# Patient Record
Sex: FEMALE | Race: WHITE | NOT HISPANIC OR LATINO | Employment: OTHER | URBAN - METROPOLITAN AREA
[De-identification: names, ages, dates, MRNs, and addresses within clinical notes are randomized per-mention and may not be internally consistent; named-entity substitution may affect disease eponyms.]

---

## 2017-05-30 ENCOUNTER — HOSPITAL ENCOUNTER (OUTPATIENT)
Dept: GENERAL RADIOLOGY | Facility: HOSPITAL | Age: 67
Discharge: HOME | End: 2017-05-30
Attending: FAMILY MEDICINE | Admitting: FAMILY MEDICINE

## 2017-05-30 ENCOUNTER — OFFICE VISIT (OUTPATIENT)
Dept: FAMILY MEDICINE CLINIC | Facility: CLINIC | Age: 67
End: 2017-05-30

## 2017-05-30 ENCOUNTER — TRANSCRIBE ORDERS (OUTPATIENT)
Dept: FAMILY MEDICINE CLINIC | Facility: CLINIC | Age: 67
End: 2017-05-30

## 2017-05-30 VITALS
BODY MASS INDEX: 28.7 KG/M2 | SYSTOLIC BLOOD PRESSURE: 130 MMHG | HEIGHT: 61 IN | OXYGEN SATURATION: 97 % | HEART RATE: 107 BPM | DIASTOLIC BLOOD PRESSURE: 88 MMHG | WEIGHT: 152 LBS

## 2017-05-30 DIAGNOSIS — M75.50 SUBACROMIAL BURSITIS: Primary | ICD-10-CM

## 2017-05-30 PROCEDURE — 20610 DRAIN/INJ JOINT/BURSA W/O US: CPT | Performed by: FAMILY MEDICINE

## 2017-05-30 PROCEDURE — 73030 X-RAY EXAM OF SHOULDER: CPT

## 2017-05-30 PROCEDURE — 99213 OFFICE O/P EST LOW 20 MIN: CPT | Performed by: FAMILY MEDICINE

## 2017-05-30 RX ORDER — CHLORAL HYDRATE 500 MG
CAPSULE ORAL
COMMUNITY

## 2017-05-30 RX ORDER — METHYLPREDNISOLONE ACETATE 80 MG/ML
80 INJECTION, SUSPENSION INTRA-ARTICULAR; INTRALESIONAL; INTRAMUSCULAR; SOFT TISSUE ONCE
Status: COMPLETED | OUTPATIENT
Start: 2017-05-30 | End: 2017-05-30

## 2017-05-30 RX ORDER — ASPIRIN 81 MG/1
81 TABLET ORAL DAILY
COMMUNITY
End: 2020-08-18

## 2017-05-30 RX ADMIN — METHYLPREDNISOLONE ACETATE 80 MG: 80 INJECTION, SUSPENSION INTRA-ARTICULAR; INTRALESIONAL; INTRAMUSCULAR; SOFT TISSUE at 14:31

## 2017-06-01 ENCOUNTER — TELEPHONE (OUTPATIENT)
Dept: FAMILY MEDICINE CLINIC | Facility: CLINIC | Age: 67
End: 2017-06-01

## 2017-06-01 NOTE — TELEPHONE ENCOUNTER
----- Message from Braydon Contreras MD sent at 6/1/2017  7:49 AM EDT -----  Call.  Shoulder x-ray is basically normal.  No arthritic changes.  No bony abnormalities or tumorous growths

## 2017-06-12 ENCOUNTER — OFFICE VISIT (OUTPATIENT)
Dept: FAMILY MEDICINE CLINIC | Facility: CLINIC | Age: 67
End: 2017-06-12

## 2017-06-12 VITALS
DIASTOLIC BLOOD PRESSURE: 86 MMHG | HEART RATE: 88 BPM | OXYGEN SATURATION: 98 % | HEIGHT: 61 IN | SYSTOLIC BLOOD PRESSURE: 120 MMHG | BODY MASS INDEX: 28.89 KG/M2 | WEIGHT: 153 LBS

## 2017-06-12 DIAGNOSIS — M75.50 SUBACROMIAL BURSITIS: Primary | ICD-10-CM

## 2017-06-12 PROCEDURE — 99213 OFFICE O/P EST LOW 20 MIN: CPT | Performed by: FAMILY MEDICINE

## 2017-06-12 RX ORDER — NAPROXEN 500 MG/1
500 TABLET ORAL 2 TIMES DAILY PRN
COMMUNITY
Start: 2017-05-30 | End: 2020-08-18

## 2017-06-12 NOTE — PROGRESS NOTES
Subjective   Maren Hollingsworth is a 66 y.o. female.     History of Present Illness   66-year-old female.  Recent issues with her right shoulder.  X-ray did not reveal a lot of degenerative changes.  Had a subacromial injection which has helped may be 70-80%.  The following portions of the patient's history were reviewed and updated as appropriate: allergies, current medications, past family history, past medical history, past social history, past surgical history and problem list.    Review of Systems   Constitutional: Positive for activity change. Negative for chills and fever.   Musculoskeletal: Positive for arthralgias. Negative for joint swelling (right shoulder).       Objective   Physical Exam   Constitutional: She appears well-nourished.   HENT:   Head: Normocephalic.   Eyes: EOM are normal.   Pulmonary/Chest: Effort normal.       Assessment/Plan   Maren was seen today for shoulder pain.    Diagnoses and all orders for this visit:    Subacromial bursitis    Follow-up of subacromial bursitis/tendinitis.  Discussed several options.  One of which is to refer her to an orthopedic surgeon.  This of course would be after getting an MRI to look for a rotator cuff tendon.  Since she has obtained improvement from the injection.  I have given her some exercises for stretching and strengthening and will tentatively follow up in 4-6 weeks.  If they feel that this isn't helping, I will arrange for an MRI.

## 2017-08-01 ENCOUNTER — OFFICE VISIT (OUTPATIENT)
Dept: OBSTETRICS AND GYNECOLOGY | Facility: CLINIC | Age: 67
End: 2017-08-01

## 2017-08-01 VITALS
SYSTOLIC BLOOD PRESSURE: 140 MMHG | WEIGHT: 157 LBS | BODY MASS INDEX: 29.64 KG/M2 | DIASTOLIC BLOOD PRESSURE: 86 MMHG | HEIGHT: 61 IN

## 2017-08-01 DIAGNOSIS — Z01.419 ENCOUNTER FOR GYNECOLOGICAL EXAMINATION WITHOUT ABNORMAL FINDING: ICD-10-CM

## 2017-08-01 DIAGNOSIS — Z13.820 SCREENING FOR OSTEOPOROSIS: ICD-10-CM

## 2017-08-01 DIAGNOSIS — M81.0 OSTEOPOROSIS: ICD-10-CM

## 2017-08-01 DIAGNOSIS — N95.2 VAGINAL ATROPHY: ICD-10-CM

## 2017-08-01 DIAGNOSIS — Z78.0 MENOPAUSE: Primary | ICD-10-CM

## 2017-08-01 PROCEDURE — G0101 CA SCREEN;PELVIC/BREAST EXAM: HCPCS | Performed by: OBSTETRICS & GYNECOLOGY

## 2017-08-01 RX ORDER — ESTRADIOL 10 UG/1
INSERT VAGINAL
Qty: 36 TABLET | Refills: 3 | Status: SHIPPED | OUTPATIENT
Start: 2017-08-01 | End: 2017-09-25 | Stop reason: SDUPTHER

## 2017-08-01 NOTE — PROGRESS NOTES
"   Chief Complaint   Patient presents with   • Gynecologic Exam   • Med Refill       Maren Hollingsworth is a 66 y.o. year old  presenting to be seen for her annual exam.Patient is menopausal and uses Vagifem, 10 µg 3 times a week for symptoms of vaginal atrophy.  She has noted improvement.  She denies bowel or urinary symptoms.  Breast imaging has been benign.    SCREENING TESTS    Year 2012   Age                         PAP    Neg.                     HPV high risk                         Mammogram     benign                    ANUJ score                         Breast MRI                         Lipids                         Vitamin D                         Colonoscopy                         DEXA  Frax (hip/any)                         Ovarian Screen                           Enter the month test was performed.  If month not known, enter \"X'  · Black numbers = normal results  · Red numbers = abnormal results  · Black X = patient reported normal  · Red X - patient reported abnormal      Referred by:    Profession:    Other info:        She exercises regularly: yes.  She wears her seat belt: yes.  She has concerns about domestic violence: no.  She has noticed changes in height: no    GYN screening history:  · Last mammogram: was done on approximately 2016 and the result was: Birads II (Benign findings)..    No Additional Complaints Reported    The following portions of the patient's history were reviewed and updated as appropriate:vital signs and   She  does not have any pertinent problems on file.  She  has a past surgical history that includes Hysteroscopy and Dilation and curettage of uterus.  Her family history includes Heart disease in her father.  She  reports that she has never smoked. She has never used smokeless tobacco. She reports that she drinks about 4.2 oz of alcohol per week  She reports " "that she does not use illicit drugs.  Current Outpatient Prescriptions   Medication Sig Dispense Refill   • aspirin 81 MG EC tablet Take 81 mg by mouth Daily.     • calcium carbonate-vitamin d (CALCIUM 600+D) 600-400 MG-UNIT per tablet Take 1 tablet by mouth Daily.     • Misc Natural Products (OSTEO BI-FLEX JOINT SHIELD PO) Take  by mouth.     • Omega-3 Fatty Acids (FISH OIL) 1000 MG capsule capsule Take  by mouth Daily With Breakfast.     • VAGIFEM 10 MCG tablet vaginal tablet Insert 1 tablet vaginally three times a week 36 tablet 3   • naproxen (NAPROSYN) 500 MG tablet Take 500 mg by mouth 2 (Two) Times a Day As Needed.       No current facility-administered medications for this visit.      She has No Known Allergies..    Review of Systems  A comprehensive review of systems was taken.  Constitutional: negative for fever, chills, activity change, appetite change, fatigue and unexpected weight change.  Respiratory: negative  Cardiovascular: negative  Gastrointestinal: negative  Genitourinary:negative  Musculoskeletal:negative  Behavioral/Psych: negative       /86  Ht 61\" (154.9 cm)  Wt 157 lb (71.2 kg)  LMP  (LMP Unknown)  BMI 29.66 kg/m2    Physical Exam    General:  alert; cooperative; well developed; well nourished   Skin:  No suspicious lesions seen   Thyroid: normal to inspection and palpation   Lungs:  clear to auscultation bilaterally   Heart:  regular rate and rhythm, S1, S2 normal, no murmur, click, rub or gallop   Breasts:  Examined in supine position  Symmetric without masses or skin dimpling  Nipples normal without inversion, lesions or discharge  There are no palpable axillary nodes   Abdomen: soft, non-tender; no masses  no umbilical or inginual hernias are present  no hepato-splenomegaly   Pelvis: Clinical staff was present for exam  External genitalia:  normal appearance of the external genitalia including Bartholin's and [place]'s glands.  Vaginal:  decrease in atrophic changes  Cervix:  " normal appearance.  Uterus:  normal size, shape and consistency. anteverted;  Adnexa:  non palpable bilaterally.  Rectal:  anus visually normal appearing. recto-vaginal exam unremarkable and confirms findings;     Lab Review   No data reviewed    Imaging  Mammogram results- benign         ASSESSMENT  Problems Addressed this Visit        Musculoskeletal and Integument    Osteoporosis       Genitourinary    Vaginal atrophy    Relevant Medications    VAGIFEM 10 MCG tablet vaginal tablet    Menopause - Primary    Relevant Orders    Liquid-based Pap Smear, Screening      Other Visit Diagnoses     Encounter for gynecological examination without abnormal finding              PLAN    Medications prescribed this encounter:    New Medications Ordered This Visit   Medications   • calcium carbonate-vitamin d (CALCIUM 600+D) 600-400 MG-UNIT per tablet     Sig: Take 1 tablet by mouth Daily.   • VAGIFEM 10 MCG tablet vaginal tablet     Sig: Insert 1 tablet vaginally three times a week     Dispense:  36 tablet     Refill:  3   · Pap test done  · DEXA ordered  · Calcium, 600 mg/ Vit. D, 400 IU daily; regular weight-bearing exercise  · Follow up: 12 month(s)  *Please note that portions of this documentation may have been completed with a voice recognition program.  Efforts were made to edit this dictation, but occasional words may have been mistranscribed.       This note was electronically signed.    LILLIANA Shearer MD  August 1, 2017  1:19 PM

## 2017-08-08 ENCOUNTER — HOSPITAL ENCOUNTER (OUTPATIENT)
Dept: BONE DENSITY | Facility: HOSPITAL | Age: 67
Discharge: HOME | End: 2017-08-08
Attending: OBSTETRICS & GYNECOLOGY | Admitting: OBSTETRICS & GYNECOLOGY

## 2017-08-08 DIAGNOSIS — Z13.820 SCREENING FOR OSTEOPOROSIS: ICD-10-CM

## 2017-08-08 PROCEDURE — 77080 DXA BONE DENSITY AXIAL: CPT

## 2017-09-12 NOTE — TELEPHONE ENCOUNTER
We received a request today from [employer], requesting authorization to dispense generic Vagifem.  This will save the patient over $100 with each refill.  Per Dr. Shearer, OK to make this substitution, and I have faxed the authorization to [employer].

## 2017-09-25 ENCOUNTER — TELEPHONE (OUTPATIENT)
Dept: OBSTETRICS AND GYNECOLOGY | Facility: CLINIC | Age: 67
End: 2017-09-25

## 2017-09-25 RX ORDER — ESTRADIOL 10 UG/1
INSERT VAGINAL
Qty: 36 TABLET | Refills: 3 | Status: SHIPPED | OUTPATIENT
Start: 2017-09-25 | End: 2018-08-07 | Stop reason: SDUPTHER

## 2017-09-25 NOTE — TELEPHONE ENCOUNTER
Pt calling to say she got RX from [facility], she had specifically requested name brand and was sent generic. She is requesting we send in for name brand only.  Explained that Bettie had gotten a msg from [facility] saying it would save pt $100 to get generic and that is why she and Dr Shearer sent in for generic. Pt says her RX cost for brand name is $25, for generic is $10. She does not want to risk possible side effects. New RX for Vagifem sent to [facility].

## 2018-08-07 ENCOUNTER — OFFICE VISIT (OUTPATIENT)
Dept: OBSTETRICS AND GYNECOLOGY | Facility: CLINIC | Age: 68
End: 2018-08-07

## 2018-08-07 VITALS
WEIGHT: 154.6 LBS | DIASTOLIC BLOOD PRESSURE: 82 MMHG | BODY MASS INDEX: 29.19 KG/M2 | HEIGHT: 61 IN | SYSTOLIC BLOOD PRESSURE: 132 MMHG

## 2018-08-07 DIAGNOSIS — Z78.0 MENOPAUSE: Primary | ICD-10-CM

## 2018-08-07 DIAGNOSIS — N95.2 VAGINAL ATROPHY: ICD-10-CM

## 2018-08-07 DIAGNOSIS — M81.0 AGE-RELATED OSTEOPOROSIS WITHOUT CURRENT PATHOLOGICAL FRACTURE: ICD-10-CM

## 2018-08-07 PROCEDURE — 99213 OFFICE O/P EST LOW 20 MIN: CPT | Performed by: OBSTETRICS & GYNECOLOGY

## 2018-08-07 RX ORDER — ESTRADIOL 10 UG/1
INSERT VAGINAL
Qty: 36 TABLET | Refills: 3 | Status: SHIPPED | OUTPATIENT
Start: 2018-08-07 | End: 2019-08-13 | Stop reason: SDUPTHER

## 2018-08-07 RX ORDER — MULTIVITAMIN WITH IRON
1 TABLET ORAL DAILY
COMMUNITY

## 2018-08-07 NOTE — PROGRESS NOTES
Chief Complaint   Patient presents with   • Gynecologic Exam     Annual exam        Maren Hollingsworth is a 67 y.o. year old  presenting to be seen because of all up for menopause with vaginal atrophy which is treated with Vagifem (name-brand) 10 µg tablets 3 times week with success.  The patient also has osteoporosis at all sites but no longer desires therapy.  She has been treated with risedronate and alendronate in the past.  She denies history of a fragility fracture.  She denies bowel or urinary symptoms.    History   Sexual Activity   • Sexual activity: Yes   • Partners: Male   • Birth control/ protection: Post-menopausal     SCREENING TESTS    Year 2012   Age                         PAP                         HPV high risk                         Mammogram      benign benign                  ANUJ score                         Breast MRI                         Lipids                         Vitamin D                         Colonoscopy                         DEXA  Frax (hip/any)      osteoporosis                   Ovarian Screen      normal                       No Additional Complaints Reported    The following portions of the patient's history were reviewed and updated as appropriate:vital signs and   She  has a past medical history of Menopause; Osteoporosis; and Vaginal atrophy.  She  does not have any pertinent problems on file.  She  has a past surgical history that includes Hysteroscopy and Dilation and curettage of uterus.  Her family history includes Heart disease in her father.  She  reports that she has never smoked. She has never used smokeless tobacco. She reports that she drinks about 4.2 oz of alcohol per week . She reports that she does not use drugs.  Current Outpatient Prescriptions   Medication Sig Dispense Refill   • calcium carbonate-vitamin d (CALCIUM 600+D) 600-400 MG-UNIT per  "tablet Take 1 tablet by mouth Daily.     • estradiol (VAGIFEM) 10 MCG tablet vaginal tablet Insert one tablet intravaginally, three times a week 36 tablet 3   • Magnesium 100 MG capsule Take 1 capsule by mouth Daily.     • Misc Natural Products (OSTEO BI-FLEX JOINT SHIELD PO) Take  by mouth.     • Omega-3 Fatty Acids (FISH OIL) 1000 MG capsule capsule Take  by mouth Daily With Breakfast.     • aspirin 81 MG EC tablet Take 81 mg by mouth Daily.     • naproxen (NAPROSYN) 500 MG tablet Take 500 mg by mouth 2 (Two) Times a Day As Needed.       No current facility-administered medications for this visit.      She has No Known Allergies..        Review of Systems  A comprehensive review of systems was done.  Constitutional: negative for fever, chills, activity change, appetite change, fatigue and unexpected weight change.  Respiratory: negative  Cardiovascular: negative  Gastrointestinal: negative  Genitourinary:negative  Musculoskeletal:negative  Behavioral/Psych: negative          /82 (BP Location: Right arm, Patient Position: Sitting, Cuff Size: Adult)   Ht 154.9 cm (60.98\")   Wt 70.1 kg (154 lb 9.6 oz)   LMP  (LMP Unknown) Comment: POST-MENOPAUSAL  BMI 29.23 kg/m²     Physical Exam    General:  alert; cooperative; well developed; well nourished   Skin:  No suspicious lesions seen   Thyroid: normal to inspection and palpation   Lungs:  clear to auscultation bilaterally   Heart:  regular rate and rhythm, S1, S2 normal, no murmur, click, rub or gallop   Breasts:  Examined in supine position  Symmetric without masses or skin dimpling  Nipples normal without inversion, lesions or discharge  There are no palpable axillary nodes   Abdomen: soft, non-tender; no masses  no umbilical or inginual hernias are present  no hepato-splenomegaly   Pelvis: Clinical staff was present for exam  External genitalia:  normal appearance of the external genitalia including Bartholin's and [place]'s glands.  Vaginal:  normal pink " mucosa without prolapse or lesions.  Cervix:  normal appearance.  Uterus:  normal size, shape and consistency. anteverted;  Adnexa:  non palpable bilaterally.  Rectal:  anus visually normal appearing. recto-vaginal exam unremarkable and confirms findings;     Lab Review   No data reviewed    Imaging   Mammogram results- Birads II, and DEXA- osteoporosis    Medical counseling was given to patient for the following topics: diagnostic results, instructions for management, risk factor reductions, prognosis, impressions, risks and benefits of treatment options and importance of treatment compliance . Total time of the encounter was 19 minute(s) and 11 minute(s)  was spent in face to face counseling.  I have counseled the patient that she has osteoporosis at all sites.  I have counseled her about her increased risk of fracture.  The patient refuses therapy.  She has taken alendronate and risedronate in the past.  She is using Vagifem tablets for atrophy and has good response.  I have counseled her about breast assessment.          ASSESSMENT  Problems Addressed this Visit        Musculoskeletal and Integument    Osteoporosis       Genitourinary    Vaginal atrophy    Relevant Medications    estradiol (VAGIFEM) 10 MCG tablet vaginal tablet    Menopause - Primary            PLAN    Medications prescribed this encounter:    New Medications Ordered This Visit   Medications   • estradiol (VAGIFEM) 10 MCG tablet vaginal tablet     Sig: Insert one tablet intravaginally, three times a week     Dispense:  36 tablet     Refill:  3   · Monthly self breast assessment and annual breast imaging  · Follow up: 12 month(s)  · Calcium, 600 mg/ Vit. D, 400 IU daily     *Please note that portions of this documentation may have been completed with a voice recognition program.  Efforts were made to edit this dictation, but occasional words may have been mistranscribed.     This note was electronically signed.    LILLIANA Shearer MD  August 7,  2018  1:32 PM

## 2019-08-13 ENCOUNTER — OFFICE VISIT (OUTPATIENT)
Dept: OBSTETRICS AND GYNECOLOGY | Facility: CLINIC | Age: 69
End: 2019-08-13

## 2019-08-13 VITALS
DIASTOLIC BLOOD PRESSURE: 80 MMHG | WEIGHT: 156.2 LBS | SYSTOLIC BLOOD PRESSURE: 156 MMHG | HEIGHT: 61 IN | BODY MASS INDEX: 29.49 KG/M2

## 2019-08-13 DIAGNOSIS — M81.0 AGE-RELATED OSTEOPOROSIS WITHOUT CURRENT PATHOLOGICAL FRACTURE: ICD-10-CM

## 2019-08-13 DIAGNOSIS — Z78.0 MENOPAUSE: Primary | ICD-10-CM

## 2019-08-13 DIAGNOSIS — N95.2 VAGINAL ATROPHY: ICD-10-CM

## 2019-08-13 PROCEDURE — G0101 CA SCREEN;PELVIC/BREAST EXAM: HCPCS | Performed by: OBSTETRICS & GYNECOLOGY

## 2019-08-13 RX ORDER — ESTRADIOL 10 UG/1
INSERT VAGINAL
Qty: 36 TABLET | Refills: 3 | Status: SHIPPED | OUTPATIENT
Start: 2019-08-13 | End: 2019-08-20 | Stop reason: SDUPTHER

## 2019-08-13 NOTE — PROGRESS NOTES
Chief Complaint   Patient presents with   • Gynecologic Exam   • Med Refill       Maren Hollingsworth is a 68 y.o. year old  presenting to be seen for her annual exam.  This patient is menopausal and uses Vagifem, 10 mcg tablets intravaginally 3 times a week to treat symptoms of vaginal atrophy.  She has no side effects on this medication.  She has previously had a hysteroscopy/D&C.  She has a history of osteoporosis but does not desire treatment.  She has been treated with alendronate and risedronate in the past.  She has had no agility fractures.  She denies bowel or urinary symptoms.    SCREENING TESTS    Year 2012   Age                         PAP                         HPV high risk                         Mammogram       benign benign                 ANUJ score                         Breast MRI                         Lipids                         Vitamin D                         Colonoscopy                         DEXA  Frax (hip/any)      Porosis                   Ovarian Screen      normal normal                      She exercises regularly: yes.  She wears her seat belt: yes.  She has concerns about domestic violence: no.  She has noticed changes in height: no    GYN screening history:  · Last mammogram: was done on approximately 2019 and the result was: Birads II (Benign findings).  · Last DEXA: was done on approximately 2017 and the results were: osteoporosis of hips and osteoporosis of spine.    No Additional Complaints Reported    The following portions of the patient's history were reviewed and updated as appropriate:vital signs and   She  has a past medical history of Menopause, Osteoporosis, and Vaginal atrophy.  She does not have any pertinent problems on file.  She  has a past surgical history that includes Hysteroscopy and Dilation and curettage of uterus.  Her family history  "includes Heart disease in her father.  She  reports that she has never smoked. She has never used smokeless tobacco. She reports that she drinks about 4.2 oz of alcohol per week. She reports that she does not use drugs.  Current Outpatient Medications   Medication Sig Dispense Refill   • aspirin 81 MG EC tablet Take 81 mg by mouth Daily.     • calcium carbonate-vitamin d (CALCIUM 600+D) 600-400 MG-UNIT per tablet Take 1 tablet by mouth Daily.     • estradiol (VAGIFEM) 10 MCG tablet vaginal tablet Insert one tablet intravaginally, three times a week 36 tablet 3   • Magnesium 100 MG capsule Take 1 capsule by mouth Daily.     • Misc Natural Products (OSTEO BI-FLEX JOINT SHIELD PO) Take  by mouth.     • naproxen (NAPROSYN) 500 MG tablet Take 500 mg by mouth 2 (Two) Times a Day As Needed.     • Omega-3 Fatty Acids (FISH OIL) 1000 MG capsule capsule Take  by mouth Daily With Breakfast.       No current facility-administered medications for this visit.      She has No Known Allergies..    Review of Systems  A comprehensive review of systems was taken.  Constitutional: negative for fever, chills, activity change, appetite change, fatigue and unexpected weight change.  Respiratory: negative  Cardiovascular: negative  Gastrointestinal: negative  Genitourinary:negative  Musculoskeletal:negative  Behavioral/Psych: negative       /80   Ht 154.9 cm (61\")   Wt 70.9 kg (156 lb 3.2 oz)   LMP  (LMP Unknown) Comment: POST-MENOPAUSAL  Breastfeeding? No   BMI 29.51 kg/m²     Physical Exam    General:  alert; cooperative; well developed; well nourished   Skin:  No suspicious lesions seen   Thyroid: normal to inspection and palpation   Lungs:  clear to auscultation bilaterally   Heart:  regular rate and rhythm, S1, S2 normal, no murmur, click, rub or gallop   Breasts:  Examined in supine position  Symmetric without masses or skin dimpling  Nipples normal without inversion, lesions or discharge  There are no palpable axillary " nodes   Abdomen: soft, non-tender; no masses  no umbilical or inguinal hernias are present  no hepato-splenomegaly   Pelvis: Clinical staff was present for exam  External genitalia:  normal appearance of the external genitalia including Bartholin's and [place]'s glands.  Vaginal:  normal pink mucosa without prolapse or lesions.  Cervix:  normal appearance.  Uterus:  normal size, shape and consistency. anteverted;  Adnexa:  non palpable bilaterally.  Rectal:  anus visually normal appearing. recto-vaginal exam unremarkable and confirms findings;     Lab Review   No data reviewed    Imaging  Mammogram results- Birads II         ASSESSMENT  Problems Addressed this Visit        Musculoskeletal and Integument    Osteoporosis       Genitourinary    Vaginal atrophy    Relevant Medications    estradiol (VAGIFEM) 10 MCG tablet vaginal tablet    Menopause - Primary          PLAN    Medications prescribed this encounter:    New Medications Ordered This Visit   Medications   • estradiol (VAGIFEM) 10 MCG tablet vaginal tablet     Sig: Insert one tablet intravaginally, three times a week     Dispense:  36 tablet     Refill:  3   · Monthly self breast assessment and annual breast imaging  · I have again discussed the clinical findings of osteoporosis with the patient.  She does not desire treatment.  I have cautioned her about fall prevention.  · Calcium, 600 mg/ Vit. D, 400 IU daily; regular weight-bearing exercise  · Follow up: 12 month(s)  *Please note that portions of this documentation may have been completed with a voice recognition program.  Efforts were made to edit this dictation, but occasional words may have been mistranscribed.       This note was electronically signed.    LILLIANA Shearer MD  August 13, 2019  1:17 PM

## 2019-08-19 ENCOUNTER — TELEPHONE (OUTPATIENT)
Dept: OBSTETRICS AND GYNECOLOGY | Facility: CLINIC | Age: 69
End: 2019-08-19

## 2019-08-19 NOTE — TELEPHONE ENCOUNTER
Attempted to return patient's call.  No answer, but I left a message on voicemail indicating that I would try to reach her tomorrow.

## 2019-08-19 NOTE — TELEPHONE ENCOUNTER
PT STATES SHE WAS IN THE OFFICE AND SPOKE TO DR PORTILLO ABOUT VAGIFEM AND SHE DOES NOT WANT THE GENERIC - SHE RECEIVED IN THE MAIL TODAY AND IT WAS GENERIC - SHE WANTS US TO CALL IT BACK IN AS NORMAL NOT THE GENERIC - I TOLD HER I WOULD GIVE THE MESSAGE TO THE NURSE - SHE WANTS TO KNOW DOES SHE NEED TO SEND THIS BACK OR KEEP IT? I ADVISED HER I WASNT SURE BUT THAT I WOULD SEND THE NURSE THIS MESSSAGE

## 2019-08-20 RX ORDER — ESTRADIOL 10 UG/1
INSERT VAGINAL
Qty: 36 TABLET | Refills: 3 | Status: SHIPPED | OUTPATIENT
Start: 2019-08-20 | End: 2020-08-18 | Stop reason: SDUPTHER

## 2019-08-20 NOTE — TELEPHONE ENCOUNTER
Will resend prescription with DNS/name brand only specified.  She states that this has happened before and her mail order pharmacy will allow her to send generic back to them.

## 2020-08-18 ENCOUNTER — OFFICE VISIT (OUTPATIENT)
Dept: OBSTETRICS AND GYNECOLOGY | Facility: CLINIC | Age: 70
End: 2020-08-18

## 2020-08-18 VITALS
BODY MASS INDEX: 29.53 KG/M2 | WEIGHT: 156.4 LBS | HEIGHT: 61 IN | TEMPERATURE: 98.6 F | SYSTOLIC BLOOD PRESSURE: 172 MMHG | DIASTOLIC BLOOD PRESSURE: 94 MMHG

## 2020-08-18 DIAGNOSIS — N95.2 VAGINAL ATROPHY: ICD-10-CM

## 2020-08-18 DIAGNOSIS — Z78.0 MENOPAUSE: Primary | ICD-10-CM

## 2020-08-18 PROCEDURE — 99213 OFFICE O/P EST LOW 20 MIN: CPT | Performed by: OBSTETRICS & GYNECOLOGY

## 2020-08-18 RX ORDER — ESTRADIOL 10 UG/1
INSERT VAGINAL
Qty: 36 TABLET | Refills: 3 | Status: SHIPPED | OUTPATIENT
Start: 2020-08-18 | End: 2021-05-10

## 2020-08-18 NOTE — PROGRESS NOTES
Chief Complaint   Patient presents with   • Gynecologic Exam   • Med Refill       Maren Hollingsworth is a 69 y.o. year old  presenting to be seen because of follow-up for menopause resulting in vaginal atrophy and dryness.  This patient does not use systemic estrogen/progestin therapy.  She uses Yuvafem vaginal tablets, 10 mcg intravaginally 3 times a week with relief of symptoms.  She has no side effects on this medication.  She has previously had a hysteroscopy/D&C/endometrial polypectomy.  She has had no other gynecologic surgery.  She denies bowel or urinary symptoms.  She has a diagnosis of osteoporosis of the spine and femoral necks.  She has had no fractures.  She refuses therapy.    Social History     Substance and Sexual Activity   Sexual Activity Yes   • Partners: Male   • Birth control/protection: Post-menopausal     SCREENING TESTS    Year 2012   Age                         PAP      Neg.                   HPV high risk                         Mammogram        benign benign                ANUJ score                         Breast MRI                         Lipids                         Vitamin D                         Colonoscopy                         DEXA  Frax (hip/any)      osteoporosis                   Ovarian Screen                             No Additional Complaints Reported    The following portions of the patient's history were reviewed and updated as appropriate:vital signs and   She  has a past medical history of Menopause, Osteoporosis, and Vaginal atrophy.  She does not have any pertinent problems on file.  She  has a past surgical history that includes Hysteroscopy and Dilation and curettage of uterus.  Her family history includes Heart disease in her father.  She  reports that she has never smoked. She has never used smokeless tobacco. She reports that she drinks about 7.0  "standard drinks of alcohol per week. She reports that she does not use drugs.  Current Outpatient Medications   Medication Sig Dispense Refill   • calcium carbonate-vitamin d (CALCIUM 600+D) 600-400 MG-UNIT per tablet Take 1 tablet by mouth Daily.     • estradiol (Vagifem) 10 MCG tablet vaginal tablet Insert one tablet intravaginally, 3X a week 36 tablet 3   • Magnesium 100 MG capsule Take 1 capsule by mouth Daily.     • Misc Natural Products (OSTEO BI-FLEX JOINT SHIELD PO) Take  by mouth.     • Omega-3 Fatty Acids (FISH OIL) 1000 MG capsule capsule Take  by mouth Daily With Breakfast.       No current facility-administered medications for this visit.      She has No Known Allergies..        Review of Systems  A review of systems was done.  She denies cough, fever, shortness of breath, and any loss of sense of taste or smell  Constitutional: negative for fever, chills, activity change, appetite change, fatigue and unexpected weight change.  Respiratory: negative  Cardiovascular: negative  Gastrointestinal: negative  Genitourinary:negative  Musculoskeletal:negative  Behavioral/Psych: negative          /94   Temp 98.6 °F (37 °C)   Ht 154.9 cm (61\")   Wt 70.9 kg (156 lb 6.4 oz)   LMP  (LMP Unknown) Comment: POST-MENOPAUSAL  Breastfeeding No   BMI 29.55 kg/m²     Physical Exam    General:  alert; cooperative; well developed; well nourished   Skin:  No suspicious lesions seen   Thyroid: normal to inspection and palpation   Lungs:  clear to auscultation bilaterally   Heart:  regular rate and rhythm, S1, S2 normal, no murmur, click, rub or gallop   Breasts:  Examined in supine position  Symmetric without masses or skin dimpling  Nipples normal without inversion, lesions or discharge  There are no palpable axillary nodes   Abdomen: soft, non-tender; no masses  no umbilical or inguinal hernias are present  no hepato-splenomegaly   Pelvis: Clinical staff was present for exam  External genitalia:  normal appearance " of the external genitalia including Bartholin's and Haslet's glands.  Vaginal:  normal pink mucosa without prolapse or lesions.  Cervix:  normal appearance.  Uterus:  normal size, shape and consistency. anteverted;  Adnexa:  non palpable bilaterally.  Rectal:  anus visually normal appearing. recto-vaginal exam unremarkable and confirms findings;     Lab Review   No data reviewed    Imaging   Mammogram results    Medical counseling was given to patient for the following topics: diagnostic results, instructions for management, risk factor reductions, impressions, risks and benefits of treatment options and importance of treatment compliance . Total time of the encounter was 21 minute(s) and 11 minute(s)  was spent in face to face counseling.  I have counseled the patient that the Yuvafem vaginal tablets are effectively treating the vaginal mucosa.  I have reviewed the low risk of side effects with her.  I counseled her in monthly self breast assessment.  I have had a long discussion with the patient about her diagnosis of osteoporosis and her increased risk of fracture.  Since she does not desire therapy I have strongly advised her to continue calcium with vitamin D and to practice fall prevention techniques.  She is aware of her increased risk.          ASSESSMENT  Problems Addressed this Visit        Genitourinary    Vaginal atrophy    Relevant Medications    estradiol (Vagifem) 10 MCG tablet vaginal tablet    Menopause - Primary           Substance History:    reports that she has never smoked. She has never used smokeless tobacco.    reports that she drinks about 7.0 standard drinks of alcohol per week.    reports that she does not use drugs.    Substance use counseling is not indicated based on patient history.  She indicates that her alcohol intake is no more than once a day.      PLAN    Medications prescribed this encounter:    New Medications Ordered This Visit   Medications   • estradiol (Vagifem) 10 MCG  tablet vaginal tablet     Sig: Insert one tablet intravaginally, 3X a week     Dispense:  36 tablet     Refill:  3   · Monthly self breast assessment and annual breast imaging  · Information about fall prevention techniques  · Follow up: 12 month(s)  · Calcium, 600 mg/ Vit. D, 400 IU daily, regular, weight-bearing exercise 4 days a week     *Please note that portions of this documentation may have been completed with a voice recognition program.  Efforts were made to edit this dictation, but occasional words may have been mistranscribed.     This note was electronically signed.    LILLIANA Shearer MD  August 18, 2020  13:16

## 2021-05-08 DIAGNOSIS — N95.2 VAGINAL ATROPHY: ICD-10-CM

## 2021-05-10 RX ORDER — ESTRADIOL 10 UG/1
INSERT VAGINAL
Qty: 36 TABLET | Refills: 1 | Status: SHIPPED | OUTPATIENT
Start: 2021-05-10 | End: 2021-08-19 | Stop reason: SDUPTHER

## 2021-08-19 ENCOUNTER — OFFICE VISIT (OUTPATIENT)
Dept: OBSTETRICS AND GYNECOLOGY | Facility: CLINIC | Age: 71
End: 2021-08-19

## 2021-08-19 VITALS
HEIGHT: 61 IN | WEIGHT: 155.4 LBS | BODY MASS INDEX: 29.34 KG/M2 | SYSTOLIC BLOOD PRESSURE: 142 MMHG | DIASTOLIC BLOOD PRESSURE: 88 MMHG

## 2021-08-19 DIAGNOSIS — Z01.419 ENCOUNTER FOR GYNECOLOGICAL EXAMINATION WITHOUT ABNORMAL FINDING: Primary | ICD-10-CM

## 2021-08-19 DIAGNOSIS — N95.2 VAGINAL ATROPHY: ICD-10-CM

## 2021-08-19 DIAGNOSIS — Z78.0 MENOPAUSE: ICD-10-CM

## 2021-08-19 PROCEDURE — G0101 CA SCREEN;PELVIC/BREAST EXAM: HCPCS | Performed by: OBSTETRICS & GYNECOLOGY

## 2021-08-19 RX ORDER — ESTRADIOL 10 UG/1
INSERT VAGINAL
Qty: 36 TABLET | Refills: 3 | Status: SHIPPED | OUTPATIENT
Start: 2021-08-19 | End: 2022-05-27 | Stop reason: SDUPTHER

## 2021-08-19 RX ORDER — MULTIPLE VITAMINS W/ MINERALS TAB 9MG-400MCG
1 TAB ORAL DAILY
COMMUNITY

## 2021-08-19 RX ORDER — ATORVASTATIN CALCIUM 20 MG/1
20 TABLET, FILM COATED ORAL DAILY
COMMUNITY

## 2021-08-19 NOTE — PROGRESS NOTES
Chief Complaint   Patient presents with   • Gynecologic Exam   • Med Refill       Maren Hollingsworth is a 70 y.o. year old  presenting to be seen for her annual exam.  This patient is menopausal and uses Yuvafem vaginal tablets, 10 mcg 3 times a week to treat symptoms of vaginal atrophy.  She has no side effects on this medication.  She has previously had a hysteroscopy/D&C/endometrial polypectomy for a benign endometrial polyp.  She has osteoporosis of the spine and hip but has refused therapy.  She has been cautioned about the need for fall prevention.  She denies bowel or urinary symptoms.  She has had Covid-19 immunization.    SCREENING TESTS    Year 2012   Age                         PAP      Neg.                   HPV high risk                         Mammogram         benign benign               ANUJ score                         Breast MRI                         Lipids                         Vitamin D                         Colonoscopy                         DEXA  Frax (hip/any)      osteoporosis                   Ovarian Screen                             She exercises regularly: yes.  She wears her seat belt: yes.  She has concerns about domestic violence: no.  She has noticed changes in height: no    GYN screening history:  · Last mammogram: was done on approximately 2021 and the result was: Birads I (Normal).  · Last DEXA: was done on approximately 2017 and the results were: osteoporosis of hips and osteoporosis of spine.    No Additional Complaints Reported    The following portions of the patient's history were reviewed and updated as appropriate:vital signs and   She  has a past medical history of Hyperlipidemia, Menopause, Osteoporosis, and Vaginal atrophy.  She does not have any pertinent problems on file.  She  has a past surgical history that includes Hysteroscopy and Dilation  "and curettage of uterus.  Her family history includes Heart disease in her father.  She  reports that she has never smoked. She has never used smokeless tobacco. She reports current alcohol use of about 7.0 standard drinks of alcohol per week. She reports that she does not use drugs.  Current Outpatient Medications   Medication Sig Dispense Refill   • atorvastatin (LIPITOR) 20 MG tablet Take 20 mg by mouth Daily.     • multivitamin with minerals (MULTIVITAMIN ADULTS 50+ PO) Take 1 tablet by mouth Daily.     • calcium carbonate-vitamin d (CALCIUM 600+D) 600-400 MG-UNIT per tablet Take 1 tablet by mouth Daily.     • estradiol (VAGIFEM) 10 MCG tablet vaginal tablet INSERT 1 TABLET VAGINALLY 3TIMES A WEEK 36 tablet 3   • Magnesium 100 MG capsule Take 1 capsule by mouth Daily.     • Misc Natural Products (OSTEO BI-FLEX JOINT SHIELD PO) Take  by mouth.     • Omega-3 Fatty Acids (FISH OIL) 1000 MG capsule capsule Take  by mouth Daily With Breakfast.       No current facility-administered medications for this visit.     She has No Known Allergies..    Review of Systems  A review of systems was taken.  Constitutional: negative for fever, chills, activity change, appetite change, fatigue and unexpected weight change.  Respiratory: negative  Cardiovascular: negative  Gastrointestinal: negative  Genitourinary:negative  Musculoskeletal:negative  Behavioral/Psych: negative     Counseling/Anticipatory Guidance Discussed: nutrition, physical activity, healthy weight, injury prevention, immunizations, screenings and self-breast exam      /88   Ht 154.9 cm (61\")   Wt 70.5 kg (155 lb 6.4 oz)   LMP  (LMP Unknown) Comment: POST-MENOPAUSAL  Breastfeeding No   BMI 29.36 kg/m²     BMI reviewed     MEDICALLY INDICATED   Physical Exam    Neuro: alert and oriented to person, place and time    General:  alert; cooperative; well developed; well nourished   Skin:  No suspicious lesions seen   Thyroid: normal to inspection and " palpation   Lungs:  breathing is unlabored  clear to auscultation bilaterally   Heart:  regular rate and rhythm, S1, S2 normal, no murmur, click, rub or gallop  normal apical impulse   Breasts:  Examined in supine position  Symmetric without masses or skin dimpling  Nipples normal without inversion, lesions or discharge  There are no palpable axillary nodes   Abdomen: soft, non-tender; no masses  no umbilical or inguinal hernias are present  no hepato-splenomegaly   Pelvis: Clinical staff was present for exam  External genitalia:  normal appearance of the external genitalia including Bartholin's and Caroleen's glands.  Vaginal:  normal pink mucosa without prolapse or lesions.  Cervix:  normal appearance.  Uterus:  normal size, shape and consistency. anteverted;  Adnexa:  non palpable bilaterally.  Rectal:  anus visually normal appearing. recto-vaginal exam unremarkable and confirms findings;     Lab Review   No data reviewed    Imaging  Mammogram results and DEXA              ASSESSMENT  Problems Addressed this Visit        Genitourinary and Reproductive     Vaginal atrophy    Relevant Medications    estradiol (VAGIFEM) 10 MCG tablet vaginal tablet    Menopause      Other Visit Diagnoses     Encounter for gynecological examination without abnormal finding    -  Primary      Diagnoses       Codes Comments    Encounter for gynecological examination without abnormal finding    -  Primary ICD-10-CM: Z01.419  ICD-9-CM: V72.31     Menopause     ICD-10-CM: Z78.0  ICD-9-CM: 627.2     Vaginal atrophy     ICD-10-CM: N95.2  ICD-9-CM: 627.3               Substance History:   reports that she has never smoked. She has never used smokeless tobacco.   reports current alcohol use of about 7.0 standard drinks of alcohol per week.   reports no history of drug use.    Substance use counseling is not indicated based on patient history.  She indicates that her alcohol intake is controlled.      PLAN    Medications prescribed this  encounter:    New Medications Ordered This Visit   Medications   • estradiol (VAGIFEM) 10 MCG tablet vaginal tablet     Sig: INSERT 1 TABLET VAGINALLY 3TIMES A WEEK     Dispense:  36 tablet     Refill:  3   · Monthly self breast assessment and annual breast imaging  · Calcium, 600 mg/ Vit. D, 400 IU daily; regular weight-bearing exercise  · Follow up: 12 month(s)  *Please note that portions of this documentation may have been completed with a voice recognition program.  Efforts were made to edit this dictation, but occasional words may have been mistranscribed.       This note was electronically signed.    LILLIANA Shearer MD  August 19, 2021  13:23 EDT

## 2022-05-27 ENCOUNTER — TELEPHONE (OUTPATIENT)
Dept: OBSTETRICS AND GYNECOLOGY | Facility: CLINIC | Age: 72
End: 2022-05-27

## 2022-05-27 DIAGNOSIS — N95.2 VAGINAL ATROPHY: ICD-10-CM

## 2022-05-27 RX ORDER — ESTRADIOL 10 UG/1
INSERT VAGINAL
Qty: 36 TABLET | Refills: 0 | Status: SHIPPED | OUTPATIENT
Start: 2022-05-27

## 2022-05-31 ENCOUNTER — TELEPHONE (OUTPATIENT)
Dept: OBSTETRICS AND GYNECOLOGY | Facility: CLINIC | Age: 72
End: 2022-05-31

## 2022-05-31 NOTE — TELEPHONE ENCOUNTER
ALEJANDRA Botello called our office because of confusion regarding this patient's prescription for Vagifem 10 mcg.  They were questioning Dr. Shearer's state license being active, when, in fact, the most recent prescription was authorized by RODRICK Suarez.  After speaking with a representative, they will cancel Dr. Shearer's old prescription and fill the prescription sent in last week from our office listing [facility] as the provider.

## 2022-08-24 ENCOUNTER — OFFICE VISIT (OUTPATIENT)
Dept: OBSTETRICS AND GYNECOLOGY | Facility: CLINIC | Age: 72
End: 2022-08-24

## 2022-08-24 VITALS
DIASTOLIC BLOOD PRESSURE: 82 MMHG | SYSTOLIC BLOOD PRESSURE: 140 MMHG | WEIGHT: 159.2 LBS | HEIGHT: 61 IN | BODY MASS INDEX: 30.06 KG/M2

## 2022-08-24 DIAGNOSIS — N95.2 ATROPHY OF VAGINA: Primary | ICD-10-CM

## 2022-08-24 PROCEDURE — 99213 OFFICE O/P EST LOW 20 MIN: CPT | Performed by: NURSE PRACTITIONER

## 2022-08-24 RX ORDER — ASPIRIN 81 MG/1
81 TABLET ORAL DAILY
COMMUNITY

## 2022-08-24 NOTE — PROGRESS NOTES
"Chief Complaint  Maren Hollingsworth is a 71 y.o.  female presenting for Gynecologic Exam and Med Refill (Estradiol (Vagifem) 10 mcg 3 X week (#36 with refills).  [facility].)    History of Present Illness  Maren is a very pleasant 72yo woman, , who is here today for follow up of atrophy in the vagina.  She is using Vagifem faithfully and feels that it is helpful.  No urinary problems.  No vaginal infections or UTIs.  She has osteoporosis, but declines bisphosphonates.  We discussed walking, calcium, and Vit D intake.  She is up to date on preventive health procedures.    The following portions of the patient's history were reviewed and updated as appropriate: allergies, current medications, past family history, past medical history, past social history, past surgical history and problem list.    No Known Allergies      Current Outpatient Medications:   •  aspirin 81 MG EC tablet, Take 81 mg by mouth Daily., Disp: , Rfl:   •  atorvastatin (LIPITOR) 20 MG tablet, Take 20 mg by mouth Daily., Disp: , Rfl:   •  calcium carbonate-vitamin d (CALCIUM 600+D) 600-400 MG-UNIT per tablet, Take 1 tablet by mouth Daily., Disp: , Rfl:   •  estradiol (VAGIFEM) 10 MCG tablet vaginal tablet, INSERT 1 TABLET VAGINALLY 3TIMES A WEEK, Disp: 36 tablet, Rfl: 0  •  Magnesium 100 MG capsule, Take 1 capsule by mouth Daily., Disp: , Rfl:   •  Misc Natural Products (OSTEO BI-FLEX JOINT SHIELD PO), Take  by mouth., Disp: , Rfl:   •  multivitamin with minerals (MULTIVITAMIN ADULTS 50+ PO), Take 1 tablet by mouth Daily., Disp: , Rfl:   •  Omega-3 Fatty Acids (FISH OIL) 1000 MG capsule capsule, Take  by mouth Daily With Breakfast., Disp: , Rfl:     Past Medical History:   Diagnosis Date   • Hyperlipidemia    • Menopause    • Osteoporosis    • Vaginal atrophy         Past Surgical History:   Procedure Laterality Date   • DILATATION AND CURETTAGE     • HYSTEROSCOPY         Objective  /82   Ht 154.9 cm (61\")   Wt 72.2 kg (159 lb 3.2 oz) "   LMP  (LMP Unknown) Comment: POST-MENOPAUSAL  Breastfeeding No   BMI 30.08 kg/m²     Physical Exam  Exam conducted with a chaperone present.   Constitutional:       Appearance: Normal appearance.   HENT:      Head: Normocephalic.   Neck:      Thyroid: No thyroid mass or thyromegaly.   Cardiovascular:      Rate and Rhythm: Normal rate and regular rhythm.      Heart sounds: Normal heart sounds.   Pulmonary:      Effort: Pulmonary effort is normal.      Breath sounds: Normal breath sounds.   Chest:   Breasts:      Right: No inverted nipple, mass, nipple discharge, axillary adenopathy or supraclavicular adenopathy.      Left: No inverted nipple, mass, nipple discharge, axillary adenopathy or supraclavicular adenopathy.       Abdominal:      Palpations: Abdomen is soft. There is no mass.      Tenderness: There is no abdominal tenderness.   Genitourinary:     General: Normal vulva.      Labia:         Right: No lesion.         Left: No lesion.       Vagina: Normal. No erythema.      Cervix: No discharge, lesion or erythema.      Uterus: Not enlarged and not tender.       Adnexa:         Right: No mass or tenderness.          Left: No mass or tenderness.        Comments: Anus appears wnl.  No rectal exam performed.  Lymphadenopathy:      Upper Body:      Right upper body: No supraclavicular or axillary adenopathy.      Left upper body: No supraclavicular or axillary adenopathy.   Neurological:      Mental Status: She is alert.         Assessment/Plan   Diagnoses and all orders for this visit:    1. Atrophy of vagina (Primary)    (Well controlled atrophy)    Procedures    40 to 64: Counseling/Anticipatory Guidance Discussed: nutrition, physical activity, screenings, self-breast exam and Avoiding falls / exercise & walking    Return in about 1 year (around 8/24/2023) for Annual physical.    Maria Del Carmen Groves, APRN  08/24/2022

## 2022-10-25 ENCOUNTER — HOSPITAL ENCOUNTER (OUTPATIENT)
Dept: CARDIOLOGY | Facility: HOSPITAL | Age: 72
Discharge: HOME | End: 2022-10-25

## 2022-10-25 ENCOUNTER — OFFICE VISIT (OUTPATIENT)
Dept: CARDIOLOGY | Facility: HOSPITAL | Age: 72
End: 2022-10-25

## 2022-10-25 VITALS
RESPIRATION RATE: 17 BRPM | DIASTOLIC BLOOD PRESSURE: 85 MMHG | SYSTOLIC BLOOD PRESSURE: 145 MMHG | OXYGEN SATURATION: 97 % | WEIGHT: 157.4 LBS | HEIGHT: 61 IN | HEART RATE: 101 BPM | TEMPERATURE: 96.9 F | BODY MASS INDEX: 29.72 KG/M2

## 2022-10-25 DIAGNOSIS — R00.0 TACHYCARDIA: ICD-10-CM

## 2022-10-25 DIAGNOSIS — R94.31 ABNORMAL ELECTROCARDIOGRAM (ECG) (EKG): ICD-10-CM

## 2022-10-25 DIAGNOSIS — E78.2 MIXED HYPERLIPIDEMIA: ICD-10-CM

## 2022-10-25 DIAGNOSIS — R00.0 TACHYCARDIA: Primary | ICD-10-CM

## 2022-10-25 PROCEDURE — 99213 OFFICE O/P EST LOW 20 MIN: CPT | Performed by: NURSE PRACTITIONER

## 2022-10-25 RX ORDER — MELATONIN
1000 DAILY
COMMUNITY

## 2022-10-25 NOTE — PROGRESS NOTES
"Chief Complaint  Establish Care and Rapid Heart Rate    Subjective    History of Present Illness {CC  Problem List  Visit  Diagnosis   Encounters  Notes  Medications  Labs  Result Review Imaging  Media :23}       History of Present Illness   71-year-old female presents the office today at the request of her primary care provider for ongoing evaluation of her tachycardia.  She reports that her Apple Watch alerted her recently that her heart rate was elevated.  She did note she felt like her heart was racing and her Apple Watch told her it was in the 160s.  Patient reports that episode lasted about an hour and resolved.  She has had no further recurrence.  Patient does report occasional palpitations but denies chest pain, dizziness, dyspnea presyncope or syncope.  Patient reports blood pressures well controlled at home 120s to 130s.  Blood pressure elevated today because patient notes she is somewhat apprehensive about being in the doctor's office.  Objective     Vital Signs:   Vitals:    10/25/22 1051 10/25/22 1053 10/25/22 1054 10/25/22 1624   BP: (!) 187/94 169/100 172/100 145/85   BP Location: Right arm Left arm Left arm Left arm   Patient Position: Sitting Sitting Standing Sitting   Cuff Size: Adult Adult Adult    Pulse: 101 94 101    Resp: 17      Temp: 96.9 °F (36.1 °C)      TempSrc: Temporal      SpO2: 97%      Weight: 71.4 kg (157 lb 6.4 oz)      Height: 154.9 cm (61\")        Body mass index is 29.74 kg/m².  Physical Exam  Vitals and nursing note reviewed.   Constitutional:       Appearance: Normal appearance.   HENT:      Head: Normocephalic.   Eyes:      Pupils: Pupils are equal, round, and reactive to light.   Cardiovascular:      Rate and Rhythm: Normal rate and regular rhythm.      Pulses: Normal pulses.      Heart sounds: Normal heart sounds. No murmur heard.  Pulmonary:      Effort: Pulmonary effort is normal.      Breath sounds: Normal breath sounds.   Abdominal:      General: Bowel sounds " are normal.      Palpations: Abdomen is soft.   Musculoskeletal:         General: Normal range of motion.      Cervical back: Normal range of motion.      Right lower leg: No edema.      Left lower leg: No edema.   Skin:     General: Skin is warm and dry.      Capillary Refill: Capillary refill takes less than 2 seconds.   Neurological:      Mental Status: She is alert and oriented to person, place, and time.   Psychiatric:         Mood and Affect: Mood normal.         Thought Content: Thought content normal.              Result Review  Data Reviewed:{ Labs  Result Review  Imaging  Med Tab  Media :23}   EKG 10/7/2022 sinus rhythm with left axis deviation, nonspecific ST changes  Sodium 146, potassium 4.4, chloride 104, carbon oxide 23, glucose 179, BUN 16, creatinine 0.8, total protein 8.5, albumin 5.2, calcium 10.0, total bilirubin 0.4, AST 35, ALT 37, alkaline phosphatase 98, WBC 8.82, RBC 5.79, hemoglobin 16.8, hematocrit 51.1           Assessment and Plan {CC Problem List  Visit Diagnosis  ROS  Review (Popup)  Health Maintenance  Quality  BestPractice  Medications  SmartSets  SnapShot Encounters  Media :23}   1. Tachycardia    - Mobile Cardiac Outpatient Telemetry; Future to rule out afib, svt   - Adult Transthoracic Echo Complete W/ Cont if Necessary Per Protocol; Future    2. Abnormal electrocardiogram (ECG) (EKG)    - Adult Transthoracic Echo Complete W/ Cont if Necessary Per Protocol; Future    3. Mixed hyperlipidemia  Stable on lipitor         Follow Up {Instructions Charge Capture  Follow-up Communications :23}   Return in about 4 weeks (around 11/22/2022) for Office visit, Monitor results.    Patient was given instructions and counseling regarding her condition or for health maintenance advice. Please see specific information pulled into the AVS if appropriate.  Patient was instructed to call the [facility] with any questions, concerns, or worsening symptoms.

## 2022-10-25 NOTE — PROGRESS NOTES
[facility] Heart Monitor Documentation    Maren Hollingsworth  1950  [id number]  10/25/22      [] ZIO XT Patch  Model [id number] Prescribed for N/A Days    · Serial Number: (N + 9 Digits) N   · Apply-By Date on Box:   · USPS Tracking Number:   · USPS Tracking        [] [employer]ian MINI PLUS Mobile Cardiac Telemetry  Model BGMINIPLUS Prescribed for 30 Days    · Serial Number: (BGM + 7 Digits) [id number]  · Shipped-By Date on Box: 9/3/22  · UPS Tracking Number: [id number]  · UPS Tracking      [] [employer] MINI Holter Monitor  Model BGMINIEL Prescribed for N/A Days    · Serial Number: (7 Digits)   · Shipped-By Date on Box:   · UPS Tracking Number: [id number]  · UPS Tracking        This monitor was applied to the patient's chest and checked for proper functioning.  Ms. Maren Hollingsworth was instructed in the proper use of this monitor.  She was given the opportunity to ask questions and left the office with the device 's instruction manual.    Jacoby Peacock MA, 11:37 EDT, 10/25/22                  [facility]

## 2022-11-03 ENCOUNTER — HOSPITAL ENCOUNTER (OUTPATIENT)
Dept: CARDIOLOGY | Facility: HOSPITAL | Age: 72
Discharge: HOME | End: 2022-11-03
Admitting: NURSE PRACTITIONER

## 2022-11-03 VITALS — BODY MASS INDEX: 29.72 KG/M2 | WEIGHT: 157.41 LBS | HEIGHT: 61 IN

## 2022-11-03 DIAGNOSIS — R94.31 ABNORMAL ELECTROCARDIOGRAM (ECG) (EKG): ICD-10-CM

## 2022-11-03 DIAGNOSIS — R00.0 TACHYCARDIA: ICD-10-CM

## 2022-11-03 LAB
ASCENDING AORTA: 2.9 CM
BH CV ECHO MEAS - AO MAX PG: 4.1 MMHG
BH CV ECHO MEAS - AO MEAN PG: 3 MMHG
BH CV ECHO MEAS - AO ROOT DIAM: 3.2 CM
BH CV ECHO MEAS - AO V2 MAX: 101 CM/SEC
BH CV ECHO MEAS - AO V2 VTI: 22 CM
BH CV ECHO MEAS - AVA(I,D): 2.33 CM2
BH CV ECHO MEAS - EDV(CUBED): 80.1 ML
BH CV ECHO MEAS - EDV(MOD-SP2): 51 ML
BH CV ECHO MEAS - EDV(MOD-SP4): 43 ML
BH CV ECHO MEAS - EF(MOD-BP): 61 %
BH CV ECHO MEAS - EF(MOD-SP2): 60.8 %
BH CV ECHO MEAS - EF(MOD-SP4): 60.5 %
BH CV ECHO MEAS - ESV(CUBED): 20.3 ML
BH CV ECHO MEAS - ESV(MOD-SP2): 20 ML
BH CV ECHO MEAS - ESV(MOD-SP4): 17 ML
BH CV ECHO MEAS - FS: 36.7 %
BH CV ECHO MEAS - IVS/LVPW: 1.14 CM
BH CV ECHO MEAS - IVSD: 1.04 CM
BH CV ECHO MEAS - LA DIMENSION: 3.4 CM
BH CV ECHO MEAS - LAT PEAK E' VEL: 11.3 CM/SEC
BH CV ECHO MEAS - LV DIASTOLIC VOL/BSA (35-75): 25.2 CM2
BH CV ECHO MEAS - LV MASS(C)D: 138.2 GRAMS
BH CV ECHO MEAS - LV MAX PG: 2.35 MMHG
BH CV ECHO MEAS - LV MEAN PG: 1 MMHG
BH CV ECHO MEAS - LV SYSTOLIC VOL/BSA (12-30): 10 CM2
BH CV ECHO MEAS - LV V1 MAX: 76.7 CM/SEC
BH CV ECHO MEAS - LV V1 VTI: 18.1 CM
BH CV ECHO MEAS - LVIDD: 4.3 CM
BH CV ECHO MEAS - LVIDS: 2.7 CM
BH CV ECHO MEAS - LVOT AREA: 2.8 CM2
BH CV ECHO MEAS - LVOT DIAM: 1.9 CM
BH CV ECHO MEAS - LVPWD: 0.91 CM
BH CV ECHO MEAS - MED PEAK E' VEL: 11.5 CM/SEC
BH CV ECHO MEAS - MV A MAX VEL: 97.2 CM/SEC
BH CV ECHO MEAS - MV DEC SLOPE: 464 CM/SEC2
BH CV ECHO MEAS - MV E MAX VEL: 65.2 CM/SEC
BH CV ECHO MEAS - MV E/A: 0.67
BH CV ECHO MEAS - MV P1/2T: 69.4 MSEC
BH CV ECHO MEAS - MVA(P1/2T): 3.2 CM2
BH CV ECHO MEAS - PI END-D VEL: 86.7 CM/SEC
BH CV ECHO MEAS - RAP SYSTOLE: 3 MMHG
BH CV ECHO MEAS - RVDD: 2.8 CM
BH CV ECHO MEAS - RVSP: 27 MMHG
BH CV ECHO MEAS - SI(MOD-SP2): 18.2 ML/M2
BH CV ECHO MEAS - SI(MOD-SP4): 15.3 ML/M2
BH CV ECHO MEAS - SV(LVOT): 51.3 ML
BH CV ECHO MEAS - SV(MOD-SP2): 31 ML
BH CV ECHO MEAS - SV(MOD-SP4): 26 ML
BH CV ECHO MEAS - TAPSE (>1.6): 2 CM
BH CV ECHO MEAS - TR MAX PG: 23.8 MMHG
BH CV ECHO MEAS - TR MAX VEL: 244 CM/SEC
BH CV ECHO MEASUREMENTS AVERAGE E/E' RATIO: 5.72
BH CV VAS BP LEFT ARM: NORMAL MMHG
BH CV XLRA - RV BASE: 3.2 CM
BH CV XLRA - RV LENGTH: 6.4 CM
BH CV XLRA - RV MID: 2.4 CM
BH CV XLRA - TDI S': 13.6 CM/SEC
IVRT: 74 MSEC
LEFT ATRIUM VOLUME INDEX: 17.1 ML/M2
LEFT ATRIUM VOLUME: 19.4 ML
MAXIMAL PREDICTED HEART RATE: 147 BPM
STRESS TARGET HR: 125 BPM

## 2022-11-03 PROCEDURE — 93306 TTE W/DOPPLER COMPLETE: CPT | Performed by: INTERNAL MEDICINE

## 2022-11-03 PROCEDURE — 93306 TTE W/DOPPLER COMPLETE: CPT

## 2022-11-08 NOTE — PROGRESS NOTES
Your echocardiogram is normal.  Your heart contracts normally.  There are no significant valvular abnormalities noted.    If you have any questions regarding this letter please let me know.    Sincerely yours,        Lety MENSAH

## 2022-11-18 PROCEDURE — 93228 REMOTE 30 DAY ECG REV/REPORT: CPT | Performed by: INTERNAL MEDICINE

## 2022-11-22 ENCOUNTER — OFFICE VISIT (OUTPATIENT)
Dept: CARDIOLOGY | Facility: HOSPITAL | Age: 72
End: 2022-11-22

## 2022-11-22 VITALS
TEMPERATURE: 96.3 F | RESPIRATION RATE: 16 BRPM | DIASTOLIC BLOOD PRESSURE: 82 MMHG | HEIGHT: 61 IN | SYSTOLIC BLOOD PRESSURE: 134 MMHG | BODY MASS INDEX: 29.85 KG/M2 | WEIGHT: 158.1 LBS | OXYGEN SATURATION: 96 % | HEART RATE: 96 BPM

## 2022-11-22 DIAGNOSIS — R00.0 TACHYCARDIA: Primary | ICD-10-CM

## 2022-11-22 DIAGNOSIS — E78.2 MIXED HYPERLIPIDEMIA: ICD-10-CM

## 2022-11-22 PROCEDURE — 99214 OFFICE O/P EST MOD 30 MIN: CPT | Performed by: NURSE PRACTITIONER

## 2022-11-22 NOTE — PROGRESS NOTES
"Chief Complaint  Rapid Heart Rate and Follow-up    Subjective    History of Present Illness {CC  Problem List  Visit  Diagnosis   Encounters  Notes  Medications  Labs  Result Review Imaging  Media :23}       History of Present Illness   71-year-old female presents the office today for ongoing evaluation of her tachycardia.  She reports that her Apple Watch alerted her recently that her heart rate was elevated.  She did note she felt like her heart was racing and her Apple Watch told her it was in the 160s.  Patient reports that episode lasted about an hour and resolved.  She has had no further recurrence.  Patient does report occasional palpitations but denies chest pain, dizziness, dyspnea presyncope or syncope.  Patient reports blood pressures well controlled at home 120s to 130s.  Blood pressure elevated today because patient notes she is somewhat apprehensive about being in the doctor's office.  Patient recently wore cardiac event monitor for 14 days and had an echo and would like to follow-up on those results.  She reports no further episodes of tachycardia since last office visit  Objective     Vital Signs:   Vitals:    11/22/22 1027 11/22/22 1050   BP: 175/81 134/82   BP Location: Left arm Left arm   Patient Position: Sitting Sitting   Cuff Size: Adult    Pulse: 96    Resp: 16    Temp: 96.3 °F (35.7 °C)    TempSrc: Temporal    SpO2: 96%    Weight: 71.7 kg (158 lb 1.6 oz)    Height: 154.9 cm (61\")      Body mass index is 29.87 kg/m².  Physical Exam  Vitals and nursing note reviewed.   Constitutional:       Appearance: Normal appearance.   HENT:      Head: Normocephalic.   Eyes:      Pupils: Pupils are equal, round, and reactive to light.   Cardiovascular:      Rate and Rhythm: Normal rate and regular rhythm.      Pulses: Normal pulses.      Heart sounds: Normal heart sounds. No murmur heard.  Pulmonary:      Effort: Pulmonary effort is normal.      Breath sounds: Normal breath sounds.   Abdominal:    "   General: Bowel sounds are normal.      Palpations: Abdomen is soft.   Musculoskeletal:         General: Normal range of motion.      Cervical back: Normal range of motion.      Right lower leg: No edema.      Left lower leg: No edema.   Skin:     General: Skin is warm and dry.      Capillary Refill: Capillary refill takes less than 2 seconds.   Neurological:      Mental Status: She is alert and oriented to person, place, and time.   Psychiatric:         Mood and Affect: Mood normal.         Thought Content: Thought content normal.              Result Review  Data Reviewed:{ Labs  Result Review  Imaging  Med Tab  Media :23}   EKG 10/7/2022 sinus rhythm with left axis deviation, nonspecific ST changes  Sodium 146, potassium 4.4, chloride 104, carbon oxide 23, glucose 179, BUN 16, creatinine 0.8, total protein 8.5, albumin 5.2, calcium 10.0, total bilirubin 0.4, AST 35, ALT 37, alkaline phosphatase 98, WBC 8.82, RBC 5.79, hemoglobin 16.8, hematocrit 51.1      Mobile cardiac telemetry worn for 14 days showed an average heart rate of 76 bpm PAC/PVC burden less than 1% with no arrhythmias noted    Echo 11/3/2022: EF 61 to 65% grade 1 diastolic dysfunction with mild MR, mild TR  Assessment and Plan {CC Problem List  Visit Diagnosis  ROS  Review (Popup)  Health Maintenance  Quality  BestPractice  Medications  SmartSets  SnapShot Encounters  Media :23}   1. Tachycardia  No arrhythmias noted on recent M cot  Continue to monitor heart rate  2. Mixed hyperlipidemia  Stable on lipitor   Follow Up {Instructions Charge Capture  Follow-up Communications :23}   Return if symptoms worsen or fail to improve.    Patient was given instructions and counseling regarding her condition or for health maintenance advice. Please see specific information pulled into the AVS if appropriate.  Patient was instructed to call the [facility] with any questions, concerns, or worsening symptoms.

## 2022-12-05 ENCOUNTER — TRANSCRIBE ORDERS (OUTPATIENT)
Dept: ADMINISTRATIVE | Facility: HOSPITAL | Age: 72
End: 2022-12-05

## 2022-12-05 DIAGNOSIS — R74.8 ELEVATED LIVER ENZYMES: Primary | ICD-10-CM

## 2022-12-19 ENCOUNTER — HOSPITAL ENCOUNTER (OUTPATIENT)
Dept: ULTRASOUND IMAGING | Facility: HOSPITAL | Age: 72
Discharge: HOME | End: 2022-12-19
Admitting: INTERNAL MEDICINE

## 2022-12-19 DIAGNOSIS — R74.8 ELEVATED LIVER ENZYMES: ICD-10-CM

## 2022-12-19 PROCEDURE — 76700 US EXAM ABDOM COMPLETE: CPT

## 2023-04-19 ENCOUNTER — TELEPHONE (OUTPATIENT)
Dept: OBSTETRICS AND GYNECOLOGY | Facility: CLINIC | Age: 73
End: 2023-04-19
Payer: MEDICARE

## 2023-04-19 RX ORDER — ESTRADIOL 10 UG/1
1 INSERT VAGINAL 3 TIMES WEEKLY
Qty: 36 TABLET | Refills: 1 | Status: SHIPPED | OUTPATIENT
Start: 2023-04-19

## 2023-04-19 NOTE — TELEPHONE ENCOUNTER
Patient requests prescription for Vagifem.  Requests [facility].  Rx Refill Note  Requested Prescriptions      No prescriptions requested or ordered in this encounter      Last office visit with prescribing clinician: 8/24/2022   Last telemedicine visit with prescribing clinician: 8/30/2023   Next office visit with prescribing clinician: 8/30/2023                         Would you like a call back once the refill request has been completed: [] Yes [] No    If the office needs to give you a call back, can they leave a voicemail: [] Yes [] No    Bettie Alfonso MA  04/19/23, 09:57 EDT

## 2023-08-30 ENCOUNTER — OFFICE VISIT (OUTPATIENT)
Dept: OBSTETRICS AND GYNECOLOGY | Facility: CLINIC | Age: 73
End: 2023-08-30
Payer: MEDICARE

## 2023-08-30 VITALS
DIASTOLIC BLOOD PRESSURE: 86 MMHG | BODY MASS INDEX: 30.51 KG/M2 | HEIGHT: 61 IN | SYSTOLIC BLOOD PRESSURE: 160 MMHG | WEIGHT: 161.6 LBS

## 2023-08-30 DIAGNOSIS — N95.2 ATROPHY OF VAGINA: ICD-10-CM

## 2023-08-30 DIAGNOSIS — B35.6 TINEA CRURIS: ICD-10-CM

## 2023-08-30 DIAGNOSIS — Z01.411 ENCOUNTER FOR GYNECOLOGICAL EXAMINATION WITH ABNORMAL FINDING: Primary | ICD-10-CM

## 2023-08-30 RX ORDER — ESTRADIOL 10 UG/1
1 INSERT VAGINAL 3 TIMES WEEKLY
Qty: 40 TABLET | Refills: 3 | Status: SHIPPED | OUTPATIENT
Start: 2023-08-30

## 2023-08-30 RX ORDER — NYSTATIN 100000 U/G
1 OINTMENT TOPICAL 2 TIMES DAILY
Qty: 30 G | Refills: 3 | Status: SHIPPED | OUTPATIENT
Start: 2023-08-30

## 2023-08-30 NOTE — PROGRESS NOTES
Chief Complaint  Maren Hollingsworth is a 72 y.o.  female presenting for Gynecologic Exam and Med Refill (Vagifem 10 mcg 3 X week (#40 with refills).  Patient desires name brand.  [facility].  )    History of Present Illness  Maren is a pleasant 73yo woman, , here for annual gyn exam.  She is distant s/p hysteroscopy with D&C.  No other pelvic surgeries.  She feels the Vagifem has been very helpful for the symptoms of atrophy.    She has known osteoporosis.  She declines any bisphosphonates or Tx for the BMD.  She is however, faithful re: calcium & Vitamin D intake, walking & exercise.    She c/o intermittent pruritic rash in groin.  Otherwise, ROS neg.    [facility] procedures up to date.    The following portions of the patient's history were reviewed and updated as appropriate: allergies, current medications, past family history, past medical history, past social history, past surgical history, and problem list.    No Known Allergies      Current Outpatient Medications:     Vagifem 10 MCG tablet vaginal tablet, Insert 1 tablet into the vagina 3 (Three) Times a Week., Disp: 40 tablet, Rfl: 3    aspirin 81 MG EC tablet, Take 81 mg by mouth Daily., Disp: , Rfl:     atorvastatin (LIPITOR) 20 MG tablet, Take 20 mg by mouth Daily., Disp: , Rfl:     cholecalciferol (VITAMIN D3) 25 MCG (1000 UT) tablet, Take 1 tablet by mouth Daily., Disp: , Rfl:     Magnesium 250 MG tablet, Take 1 tablet by mouth Daily., Disp: , Rfl:     Misc Natural Products (OSTEO BI-FLEX JOINT SHIELD PO), Take  by mouth., Disp: , Rfl:     multivitamin with minerals tablet tablet, Take 1 tablet by mouth Daily., Disp: , Rfl:     nystatin (MYCOSTATIN) 990966 UNIT/GM ointment, Apply 1 application  topically to the appropriate area as directed 2 (Two) Times a Day., Disp: 30 g, Rfl: 3    Omega-3 Fatty Acids (FISH OIL) 1000 MG capsule capsule, Take  by mouth Daily With Breakfast., Disp: , Rfl:     Past Medical History:   Diagnosis Date     "Hyperlipidemia     Menopause     Osteoporosis     Vaginal atrophy         Past Surgical History:   Procedure Laterality Date    DILATATION AND CURETTAGE      HYSTEROSCOPY         Objective  /86   Ht 154.9 cm (61\")   Wt 73.3 kg (161 lb 9.6 oz)   LMP  (LMP Unknown) Comment: POST-MENOPAUSAL  Breastfeeding No   BMI 30.53 kg/mý     Physical Exam  Vitals and nursing note reviewed. Exam conducted with a chaperone present.   Constitutional:       General: She is not in acute distress.     Appearance: Normal appearance. She is not ill-appearing.   HENT:      Head: Normocephalic.   Neck:      Thyroid: No thyroid mass or thyromegaly.   Cardiovascular:      Rate and Rhythm: Normal rate and regular rhythm.      Heart sounds: Normal heart sounds. No murmur heard.  Pulmonary:      Effort: Pulmonary effort is normal. No respiratory distress.      Breath sounds: Normal breath sounds.   Chest:   Breasts:     Right: No inverted nipple, mass or nipple discharge.      Left: No inverted nipple, mass or nipple discharge.   Abdominal:      Palpations: Abdomen is soft. There is no mass.      Tenderness: There is no abdominal tenderness.   Genitourinary:     General: Normal vulva.      Labia:         Right: No rash, tenderness or lesion.         Left: No rash, tenderness or lesion.       Vagina: Normal. No erythema.      Cervix: No discharge, lesion or erythema.      Uterus: Not enlarged and not tender.       Adnexa:         Right: No mass or tenderness.          Left: No mass or tenderness.        Comments: Anus appears wnl.  No rectal exam performed.  Lymphadenopathy:      Upper Body:      Right upper body: No supraclavicular or axillary adenopathy.      Left upper body: No supraclavicular or axillary adenopathy.   Skin:     General: Skin is warm and dry.   Neurological:      Mental Status: She is alert and oriented to person, place, and time.   Psychiatric:         Mood and Affect: Mood normal.         Behavior: Behavior " normal.       Assessment/Plan   Diagnoses and all orders for this visit:    1. Encounter for gynecological examination with abnormal finding (Primary)    2. Tinea cruris  -     nystatin (MYCOSTATIN) 495891 UNIT/GM ointment; Apply 1 application  topically to the appropriate area as directed 2 (Two) Times a Day.  Dispense: 30 g; Refill: 3    3. Atrophy of vagina  -     Vagifem 10 MCG tablet vaginal tablet; Insert 1 tablet into the vagina 3 (Three) Times a Week.  Dispense: 40 tablet; Refill: 3        Procedures    40 to 64: Counseling/Anticipatory Guidance Discussed: nutrition, physical activity, screenings, and self-breast exam    Return in about 1 year (around 8/30/2024) for Annual physical.    Maria Del Carmen Groves, APRPHILLIP  08/30/2023

## 2023-09-20 ENCOUNTER — HOSPITAL ENCOUNTER (OUTPATIENT)
Dept: GENERAL RADIOLOGY | Facility: HOSPITAL | Age: 73
Discharge: HOME | End: 2023-09-20
Admitting: INTERNAL MEDICINE
Payer: MEDICARE

## 2023-09-20 ENCOUNTER — TRANSCRIBE ORDERS (OUTPATIENT)
Dept: ADMINISTRATIVE | Facility: HOSPITAL | Age: 73
End: 2023-09-20
Payer: MEDICARE

## 2023-09-20 DIAGNOSIS — R68.84 JAW PAIN: Primary | ICD-10-CM

## 2023-09-20 PROCEDURE — 70110 X-RAY EXAM OF JAW 4/> VIEWS: CPT

## 2024-09-11 ENCOUNTER — OFFICE VISIT (OUTPATIENT)
Dept: OBSTETRICS AND GYNECOLOGY | Facility: CLINIC | Age: 74
End: 2024-09-11
Payer: MEDICARE

## 2024-09-11 VITALS
SYSTOLIC BLOOD PRESSURE: 160 MMHG | WEIGHT: 155.8 LBS | DIASTOLIC BLOOD PRESSURE: 88 MMHG | BODY MASS INDEX: 29.42 KG/M2 | HEIGHT: 61 IN

## 2024-09-11 DIAGNOSIS — N95.2 ATROPHY OF VAGINA: ICD-10-CM

## 2024-09-11 DIAGNOSIS — Z01.411 ENCOUNTER FOR GYNECOLOGICAL EXAMINATION WITH ABNORMAL FINDING: Primary | ICD-10-CM

## 2024-09-11 DIAGNOSIS — B35.6 TINEA CRURIS: ICD-10-CM

## 2024-09-11 PROCEDURE — 1159F MED LIST DOCD IN RCRD: CPT | Performed by: NURSE PRACTITIONER

## 2024-09-11 PROCEDURE — 1160F RVW MEDS BY RX/DR IN RCRD: CPT | Performed by: NURSE PRACTITIONER

## 2024-09-11 PROCEDURE — G0101 CA SCREEN;PELVIC/BREAST EXAM: HCPCS | Performed by: NURSE PRACTITIONER

## 2024-09-11 RX ORDER — LOSARTAN POTASSIUM 50 MG/1
50 TABLET ORAL DAILY
COMMUNITY
Start: 2024-07-10

## 2024-09-11 RX ORDER — NYSTATIN 100000 [USP'U]/G
POWDER TOPICAL 2 TIMES DAILY
Qty: 30 G | Refills: 2 | Status: SHIPPED | OUTPATIENT
Start: 2024-09-11

## 2024-09-11 RX ORDER — ESTRADIOL 10 UG/1
1 INSERT VAGINAL 3 TIMES WEEKLY
Qty: 40 TABLET | Refills: 3 | Status: SHIPPED | OUTPATIENT
Start: 2024-09-11

## 2024-09-11 NOTE — PROGRESS NOTES
Chief Complaint  Maren Hollingsworth is a 73 y.o.  female presenting for Gynecologic Exam (Recent treatment with antibiotics.  Patient has taken 2 doses of fluconazole.  ) and Med Refill (Vagifem 10 mcg (#36 with refills/3 X week).  [facility] )    History of Present Illness  Maren is a 74yo woman, , here for gyn exam.  Her past surgical history:  hysteroscopy with D&C.  Medical history is significant for hyperlipidemia, menopause, osteoporosis, and vaginal atrophy.  She uses Vagifem for the atrophy.  She is intentional re: calcium & Vitamin D intake.  She is also faithful re: walking and wt bearing exercise.  She declines bisphosphonates.  Having tinea cruris flare in groin.  Will use the ointment for tx; would like to use powder to help pvt.  She has remainder of script for Diflucan too.    Last Cologuard July or Aug 2024, neg  Last pap 2017, neg  Last mammogram 24, neg  Declines further BMD testing.      The following portions of the patient's history were reviewed and updated as appropriate: allergies, current medications, past family history, past medical history, past social history, past surgical history, and problem list.    No Known Allergies      Current Outpatient Medications:     losartan (COZAAR) 50 MG tablet, Take 1 tablet by mouth Daily., Disp: , Rfl:     Vagifem 10 MCG tablet vaginal tablet, Insert 1 tablet into the vagina 3 (Three) Times a Week., Disp: 40 tablet, Rfl: 3    aspirin 81 MG EC tablet, Take 81 mg by mouth Daily., Disp: , Rfl:     atorvastatin (LIPITOR) 20 MG tablet, Take 20 mg by mouth Daily., Disp: , Rfl:     cholecalciferol (VITAMIN D3) 25 MCG (1000 UT) tablet, Take 1 tablet by mouth Daily., Disp: , Rfl:     Magnesium 250 MG tablet, Take 1 tablet by mouth Daily., Disp: , Rfl:     Misc Natural Products (OSTEO BI-FLEX JOINT SHIELD PO), Take  by mouth., Disp: , Rfl:     multivitamin with minerals tablet tablet, Take 1 tablet by mouth Daily., Disp: , Rfl:     nystatin  "(MYCOSTATIN) 864239 UNIT/GM ointment, Apply 1 application  topically to the appropriate area as directed 2 (Two) Times a Day., Disp: 30 g, Rfl: 3    nystatin (MYCOSTATIN) [id number] UNIT/GM powder, Apply  topically to the appropriate area as directed 2 (Two) Times a Day., Disp: 30 g, Rfl: 2    Omega-3 Fatty Acids (FISH OIL) 1000 MG capsule capsule, Take  by mouth Daily With Breakfast., Disp: , Rfl:     Past Medical History:   Diagnosis Date    Hyperlipidemia     Hypertension     Menopause     Osteoporosis     Vaginal atrophy         Past Surgical History:   Procedure Laterality Date    DILATATION AND CURETTAGE      HYSTEROSCOPY         Objective  /88   Ht 154.9 cm (61\")   Wt 70.7 kg (155 lb 12.8 oz)   LMP  (LMP Unknown) Comment: POST-MENOPAUSAL  Breastfeeding No   BMI 29.44 kg/m²     Physical Exam  Vitals and nursing note reviewed. Exam conducted with a chaperone present.   Constitutional:       General: She is not in acute distress.     Appearance: Normal appearance. She is not ill-appearing.   HENT:      Head: Normocephalic.   Neck:      Thyroid: No thyroid mass or thyromegaly.   Cardiovascular:      Rate and Rhythm: Normal rate and regular rhythm.      Heart sounds: Normal heart sounds. No murmur heard.  Pulmonary:      Effort: Pulmonary effort is normal. No respiratory distress.      Breath sounds: Normal breath sounds.   Chest:   Breasts:     Right: No inverted nipple, mass or nipple discharge.      Left: No inverted nipple, mass or nipple discharge.   Abdominal:      Palpations: Abdomen is soft. There is no mass.      Tenderness: There is no abdominal tenderness.   Genitourinary:     General: Normal vulva.      Labia:         Right: No rash, tenderness or lesion.         Left: No rash, tenderness or lesion.       Vagina: Normal. No erythema.      Cervix: No discharge, lesion or erythema.      Uterus: Not enlarged and not tender.       Adnexa:         Right: No mass or tenderness.          Left: No mass " or tenderness.        Comments: Anus appears wnl.  No rectal exam performed.  Lymphadenopathy:      Upper Body:      Right upper body: No supraclavicular or axillary adenopathy.      Left upper body: No supraclavicular or axillary adenopathy.   Skin:     General: Skin is warm and dry.      Findings: Rash present.      Comments: Tinea cruris bilat groin  (vagina and labia wnl)   Neurological:      Mental Status: She is alert and oriented to person, place, and time.   Psychiatric:         Mood and Affect: Mood normal.         Behavior: Behavior normal.         Assessment/Plan   Diagnoses and all orders for this visit:    1. Encounter for gynecological examination with abnormal finding (Primary)    2. Tinea cruris    3. Atrophy of vagina  -     Vagifem 10 MCG tablet vaginal tablet; Insert 1 tablet into the vagina 3 (Three) Times a Week.  Dispense: 40 tablet; Refill: 3    Other orders  -     nystatin (MYCOSTATIN) 689602 UNIT/GM powder; Apply  topically to the appropriate area as directed 2 (Two) Times a Day.  Dispense: 30 g; Refill: 2        Procedures    40 to 64: Counseling/Anticipatory Guidance Discussed: screenings, self-breast exam, and tinea care.    Return in about 1 year (around 9/11/2025) for Annual physical.    Maria Del Carmen Groves, APRN  09/11/2024

## 2025-08-22 DIAGNOSIS — N95.2 ATROPHY OF VAGINA: ICD-10-CM

## 2025-08-23 RX ORDER — ESTRADIOL 10 UG/1
1 INSERT VAGINAL 3 TIMES WEEKLY
Qty: 40 TABLET | Refills: 3 | Status: SHIPPED | OUTPATIENT
Start: 2025-08-25